# Patient Record
Sex: FEMALE | Race: BLACK OR AFRICAN AMERICAN | URBAN - METROPOLITAN AREA
[De-identification: names, ages, dates, MRNs, and addresses within clinical notes are randomized per-mention and may not be internally consistent; named-entity substitution may affect disease eponyms.]

---

## 2024-05-03 ENCOUNTER — HOSPITAL ENCOUNTER (EMERGENCY)
Facility: HOSPITAL | Age: 34
Discharge: LEFT BEFORE DISCHARGE | End: 2024-05-03

## 2024-05-03 VITALS
TEMPERATURE: 98 F | WEIGHT: 264 LBS | SYSTOLIC BLOOD PRESSURE: 113 MMHG | HEIGHT: 65 IN | HEART RATE: 75 BPM | BODY MASS INDEX: 43.99 KG/M2 | RESPIRATION RATE: 20 BRPM | DIASTOLIC BLOOD PRESSURE: 79 MMHG | OXYGEN SATURATION: 99 %

## 2024-05-03 PROCEDURE — 93005 ELECTROCARDIOGRAM TRACING: CPT

## 2024-05-03 RX ORDER — VALSARTAN AND HYDROCHLOROTHIAZIDE 160; 25 MG/1; MG/1
1 TABLET ORAL DAILY
COMMUNITY

## 2024-05-03 NOTE — ED INITIAL ASSESSMENT (HPI)
PT to ED.   PT reporting high blood pressure and dizzy x 3 days. Took blood pressure while at [employer]. Hx of htn, prescribed valsartan-hydrochlorothiazide 160-25 mg, stopped taking medication in January, started taking medication again 3 days due to high blood pressure from \"something I ate\".

## 2024-05-04 LAB
ATRIAL RATE: 70 BPM
P AXIS: 19 DEGREES
P-R INTERVAL: 162 MS
Q-T INTERVAL: 424 MS
QRS DURATION: 76 MS
QTC CALCULATION (BEZET): 457 MS
R AXIS: 19 DEGREES
T AXIS: -1 DEGREES
VENTRICULAR RATE: 70 BPM

## 2024-07-12 ENCOUNTER — HOSPITAL ENCOUNTER (OUTPATIENT)
Age: 34
Discharge: HOME | End: 2024-07-12
Payer: COMMERCIAL

## 2024-07-12 VITALS
OXYGEN SATURATION: 99 % | SYSTOLIC BLOOD PRESSURE: 111 MMHG | HEART RATE: 79 BPM | TEMPERATURE: 97 F | DIASTOLIC BLOOD PRESSURE: 86 MMHG | RESPIRATION RATE: 20 BRPM

## 2024-07-12 DIAGNOSIS — N76.0 ACUTE VAGINITIS: Primary | ICD-10-CM

## 2024-07-12 LAB — B-HCG UR QL: NEGATIVE

## 2024-07-12 PROCEDURE — 87491 CHLMYD TRACH DNA AMP PROBE: CPT | Performed by: NURSE PRACTITIONER

## 2024-07-12 PROCEDURE — 87591 N.GONORRHOEAE DNA AMP PROB: CPT | Performed by: NURSE PRACTITIONER

## 2024-07-12 RX ORDER — METRONIDAZOLE 500 MG/1
500 TABLET ORAL EVERY 12 HOURS
Qty: 14 TABLET | Refills: 0 | Status: SHIPPED | OUTPATIENT
Start: 2024-07-12 | End: 2024-07-19

## 2024-07-12 NOTE — ED PROVIDER NOTES
Patient Seen in: [facility]      History     Chief Complaint   Patient presents with    Eval-G     Stated Complaint: eval-g    Subjective:   35 y/o female with medical conditions as noted below presents with c/o vaginal itching, irritation and vaginal discharge for 1 week. States has history of recurrent BV. Usually uses boric acid , which helps. No improvement with boric acid. Took a diflucan yesterday. No urinary symptoms, vaginal bleeding, pelvic pain, fever/chills.  Is currently sexually active            Objective:   Past Medical History:    Anxiety    Esophageal reflux    Essential hypertension              History reviewed. No pertinent surgical history.             Social History     Socioeconomic History    Marital status:    Tobacco Use    Smoking status: Never    Smokeless tobacco: Never   Vaping Use    Vaping status: Never Used   Substance and Sexual Activity    Alcohol use: Yes     Comment: socially    Drug use: Yes     Types: Cannabis     Comment: socially     Social Determinants of Health      Received from [facility]              Review of Systems   Constitutional:  Negative for chills and fever.   Gastrointestinal:  Negative for abdominal pain, diarrhea, nausea and vomiting.   Genitourinary:  Positive for vaginal discharge. Negative for dysuria, frequency, urgency and vaginal bleeding.   Neurological:  Negative for headaches.   All other systems reviewed and are negative.      Positive for stated Chief Complaint: Eval-G    Other systems are as noted in HPI.  Constitutional and vital signs reviewed.      All other systems reviewed and negative except as noted above.    Physical Exam     ED Triage Vitals [07/12/24 1227]   /86   Pulse 79   Resp 20   Temp 97.3 °F (36.3 °C)   Temp src Temporal   SpO2 99 %   O2 Device None (Room air)       Current Vitals:   Vital Signs  BP: 111/86  Pulse: 79  Resp: 20  Temp: 97.3 °F (36.3 °C)  Temp src: Temporal    Oxygen  Therapy  SpO2: 99 %  O2 Device: None (Room air)            Physical Exam  Vitals and nursing note reviewed. Exam conducted with a chaperone present (JYOTI Serrano).   Constitutional:       Appearance: Normal appearance.   HENT:      Head: Normocephalic.   Cardiovascular:      Rate and Rhythm: Normal rate and regular rhythm.   Pulmonary:      Effort: Pulmonary effort is normal.      Breath sounds: Normal breath sounds.   Abdominal:      General: Bowel sounds are normal.      Tenderness: There is no abdominal tenderness. There is no right CVA tenderness or left CVA tenderness.   Genitourinary:     Exam position: Lithotomy position.      Vagina: Vaginal discharge present.      Comments: BUS normal - cervical os closed with small amount white discharge.  no adnexal tenderness, no CMT, no genital herpes lesions noted.      Skin:     General: Skin is warm and dry.      Capillary Refill: Capillary refill takes less than 2 seconds.   Neurological:      Mental Status: She is alert and oriented to person, place, and time.               ED Course     Labs Reviewed   POCT PREGNANCY URINE - Normal   CHLAMYDIA/GONOCOCCUS, DECLAN   VAGINITIS VAGINOSIS PCR PANEL                      MDM                  Medical Decision Making  Patient is well-appearing.  I discussed differentials with patient including but not limited to yeast vaginitis versus bacterial vaginosis versus STI  Chaperoned pelvic exam done.  Vaginal cultures pending  Avoid sexual activity x 1 week  Rx metronidazole   Fu with PCP. Return/ ED precautions discussed      Problems Addressed:  Acute vaginitis: acute illness or injury    Amount and/or Complexity of Data Reviewed  Labs: ordered. Decision-making details documented in ED Course.        Disposition and Plan     Clinical Impression:  1. Acute vaginitis         Disposition:  Discharge  7/12/2024  1:05 pm    Follow-up:  Doreen Sen MD  [address] [place] [address]  [address]  [place]  [address]  [phone]                Medications Prescribed:  Current Discharge Medication List        START taking these medications    Details   metRONIDAZOLE 500 MG Oral Tab Take 1 tablet (500 mg total) by mouth every 12 (twelve) hours for 7 days.  Qty: 14 tablet, Refills: 0

## 2024-07-12 NOTE — ED INITIAL ASSESSMENT (HPI)
Pt here with white vaginal discharge, itching and irritation for the last week. Denies odor or pain. Pt recently changed partners and is concerned for BV. Pt has hx of BV with new partners

## 2024-07-13 LAB
BV BACTERIA DNA VAG QL NAA+PROBE: NEGATIVE
C GLABRATA DNA VAG QL NAA+PROBE: NEGATIVE
C KRUSEI DNA VAG QL NAA+PROBE: NEGATIVE
CANDIDA DNA VAG QL NAA+PROBE: NEGATIVE
T VAGINALIS DNA VAG QL NAA+PROBE: NEGATIVE

## 2024-07-15 LAB
C TRACH DNA SPEC QL NAA+PROBE: NEGATIVE
N GONORRHOEA DNA SPEC QL NAA+PROBE: NEGATIVE

## 2024-08-25 ENCOUNTER — HOSPITAL ENCOUNTER (OUTPATIENT)
Age: 34
Discharge: HOME | End: 2024-08-25
Payer: COMMERCIAL

## 2024-08-25 VITALS
DIASTOLIC BLOOD PRESSURE: 82 MMHG | HEART RATE: 95 BPM | SYSTOLIC BLOOD PRESSURE: 129 MMHG | TEMPERATURE: 98 F | OXYGEN SATURATION: 98 % | RESPIRATION RATE: 20 BRPM

## 2024-08-25 DIAGNOSIS — J06.9 UPPER RESPIRATORY TRACT INFECTION, UNSPECIFIED TYPE: Primary | ICD-10-CM

## 2024-08-25 DIAGNOSIS — J98.01 BRONCHOSPASM: ICD-10-CM

## 2024-08-25 DIAGNOSIS — U07.1 COVID-19: ICD-10-CM

## 2024-08-25 DIAGNOSIS — Z20.822 ENCOUNTER FOR LABORATORY TESTING FOR COVID-19 VIRUS: ICD-10-CM

## 2024-08-25 PROBLEM — R06.02 SHORTNESS OF BREATH: Status: ACTIVE | Noted: 2020-04-13

## 2024-08-25 PROBLEM — K59.00 CONSTIPATION: Status: ACTIVE | Noted: 2021-05-26

## 2024-08-25 PROBLEM — K21.9 GASTROESOPHAGEAL REFLUX DISEASE WITHOUT ESOPHAGITIS: Status: ACTIVE | Noted: 2020-04-13

## 2024-08-25 PROBLEM — R13.10 DYSPHAGIA: Status: ACTIVE | Noted: 2021-05-26

## 2024-08-25 PROBLEM — A63.0 GENITAL WARTS DUE TO HPV (HUMAN PAPILLOMAVIRUS): Status: ACTIVE | Noted: 2023-02-24

## 2024-08-25 LAB — SARS-COV-2 RNA RESP QL NAA+PROBE: DETECTED

## 2024-08-25 RX ORDER — ALBUTEROL SULFATE 90 UG/1
AEROSOL, METERED RESPIRATORY (INHALATION)
Qty: 1 EACH | Refills: 0 | Status: SHIPPED | OUTPATIENT
Start: 2024-08-25

## 2024-08-25 RX ORDER — BENZONATATE 100 MG/1
100 CAPSULE ORAL 3 TIMES DAILY PRN
Qty: 30 CAPSULE | Refills: 0 | Status: SHIPPED | OUTPATIENT
Start: 2024-08-25

## 2024-08-25 NOTE — DISCHARGE INSTRUCTIONS
Mucinex every 4-6 hours as needed.   Use spacer with inhaler as discussed  Try to avoid dairy.   Tessalon Perles same time or in between Mucinex.

## 2024-08-25 NOTE — ED INITIAL ASSESSMENT (HPI)
Pt came in due to cough, congestion, runny nose, headache, fatigue, chills, body aches, and decreased appetite for the past few days. Pt has easy non labored respirations.

## 2024-08-25 NOTE — ED PROVIDER NOTES
Patient Seen in: [facility]      History     Chief Complaint   Patient presents with    Cough/URI     Stated Complaint: Cough;Runny nose;Chills;Severe headache;Shortness of breath    Subjective:   HPI    Emperatriz Lanza is a 34 year old female here for cough, runny nose, headache, chills, body aches over the last few days. Feels like she cannot take a deep breath especially after coughing.  Medical history includes but not limited to anxiety, acid reflux, and hypertension.  Inconsistent conservative treatment with minimal relief.  No hemoptysis, recent travel, or other complaints at this time.  Beginning clear full sentences, and hemodynamically appropriate for presenting complaint.      Objective:   Past Medical History:    Anxiety    Esophageal reflux    Essential hypertension              History reviewed. No pertinent surgical history.             No pertinent social history.            Review of Systems    Positive for stated Chief Complaint: Cough/URI    Other systems are as noted in HPI.  Constitutional and vital signs reviewed.      All other systems reviewed and negative except as noted above.    Physical Exam     ED Triage Vitals [08/25/24 1453]   /82   Pulse 95   Resp 20   Temp 98.1 °F (36.7 °C)   Temp src Temporal   SpO2 98 %   O2 Device None (Room air)       Current Vitals:   Vital Signs  BP: 129/82  Pulse: 95  Resp: 20  Temp: 98.1 °F (36.7 °C)  Temp src: Temporal    Oxygen Therapy  SpO2: 98 %  O2 Device: None (Room air)            Physical Exam  Vitals and nursing note reviewed.   Constitutional:       General: She is not in acute distress.     Appearance: Normal appearance. She is not toxic-appearing.   HENT:      Head: Normocephalic.      Right Ear: Tympanic membrane, ear canal and external ear normal.      Left Ear: Tympanic membrane, ear canal and external ear normal.      Nose: Congestion present.      Mouth/Throat:      Mouth: Mucous membranes are moist.      Pharynx: No  oropharyngeal exudate or posterior oropharyngeal erythema.   Eyes:      Extraocular Movements: Extraocular movements intact.      Conjunctiva/sclera: Conjunctivae normal.      Pupils: Pupils are equal, round, and reactive to light.   Cardiovascular:      Rate and Rhythm: Normal rate.      Pulses: Normal pulses.   Pulmonary:      Effort: Pulmonary effort is normal.   Musculoskeletal:         General: No swelling.      Cervical back: Normal range of motion. No erythema or rigidity. No pain with movement, spinous process tenderness or muscular tenderness. Normal range of motion.      Right lower leg: No edema.      Left lower leg: No edema.   Lymphadenopathy:      Cervical: No cervical adenopathy.      Right cervical: No superficial, deep or posterior cervical adenopathy.     Left cervical: No superficial, deep or posterior cervical adenopathy.   Skin:     General: Skin is warm.      Capillary Refill: Capillary refill takes less than 2 seconds.      Findings: No bruising, erythema, lesion or rash.   Neurological:      General: No focal deficit present.      Mental Status: She is alert and oriented to person, place, and time.   Psychiatric:         Mood and Affect: Mood normal.         Behavior: Behavior normal.         Thought Content: Thought content normal.         Judgment: Judgment normal.           ED Course     Labs Reviewed   RAPID SARS-COV-2 BY PCR - Abnormal; Notable for the following components:       Result Value    Rapid SARS-CoV-2 by PCR Detected (*)     All other components within normal limits                      MDM                                         Medical Decision Making  Ddx: URI vs LRI, allergies, reactive, COVID, FLU, RSV, or somatic causes of symptoms    Treat for viral upper respiratory infection r/t covid.  Tessalon Perles, and albuterol sent to the pharmacy to use as directed.  Spacer, teaching done here.  Perc (-). Lungs cta. Xray not indicated a this time.   Supportive care include but  not limited to otc cold medications if there is no contraindication, cool mist humidifier, and oral hydration.  Avoid dairy if possible; This increases mucus production.  Antibiotics do not treat symptoms, or viral illnesses; They are not indicated at this time.    No stridor, No hot muffled speech, and no signs of compromise. Tolerating PO. Neuro wnl.   Reinforced ER precautions, and f/u care as needed. All questions answered, and reassurance given. No acute distress and cleared for home.      Problems Addressed:  COVID-19: acute illness or injury  Encounter for laboratory testing for COVID-19 virus: acute illness or injury  Upper respiratory tract infection, unspecified type: acute illness or injury    Amount and/or Complexity of Data Reviewed  Labs: ordered. Decision-making details documented in ED Course.     Details: Independant interpretation, and reviewed with patient       Risk  OTC drugs.  Prescription drug management.        Disposition and Plan     Clinical Impression:  1. Upper respiratory tract infection, unspecified type    2. Encounter for laboratory testing for COVID-19 virus    3. COVID-19    4. Bronchospasm         Disposition:  Discharge  8/25/2024  3:15 pm    Follow-up:  Doreen Sen MD  [address] [place] [address]  [phone]                Medications Prescribed:  Discharge Medication List as of 8/25/2024  3:17 PM        START taking these medications    Details   nirmatrelvir-ritonavir 300-100 MG Oral Tablet Therapy Pack Take two nirmatrelvir tablets (300mg) with one ritonavir tablet (100mg) together twice daily for 5 days., Print, Disp-30 tablet, R-0This may be filled at any pharmacy.  NPI [id number].  Collaborating physician Briana Bui.      albuterol 108 (90 Base) MCG/ACT Inhalation Aero Soln Inhale 1 puff and hold breath for 10 seconds then exhale.  Wait 1 full minute and repeat for second puff.  Use every 4-6 hours as needed., Normal, Disp-1 each, R-0NPI  [id number]. Collaborating MD Briana Bui.      benzonatate 100 MG Oral Cap Take 1 capsule (100 mg total) by mouth 3 (three) times daily as needed for cough., Normal, Disp-30 capsule, R-0NPI [id number].  Collaborating physician Briana Bui.

## 2024-08-27 ENCOUNTER — APPOINTMENT (OUTPATIENT)
Dept: GENERAL RADIOLOGY | Age: 34
End: 2024-08-27
Payer: COMMERCIAL

## 2024-08-27 ENCOUNTER — HOSPITAL ENCOUNTER (OUTPATIENT)
Age: 34
Discharge: HOME | End: 2024-08-27
Payer: COMMERCIAL

## 2024-08-27 ENCOUNTER — APPOINTMENT (OUTPATIENT)
Dept: CT IMAGING | Facility: HOSPITAL | Age: 34
End: 2024-08-27
Payer: COMMERCIAL

## 2024-08-27 VITALS
OXYGEN SATURATION: 100 % | HEART RATE: 90 BPM | SYSTOLIC BLOOD PRESSURE: 116 MMHG | DIASTOLIC BLOOD PRESSURE: 83 MMHG | TEMPERATURE: 98 F | RESPIRATION RATE: 18 BRPM

## 2024-08-27 DIAGNOSIS — F41.9 ANXIETY: ICD-10-CM

## 2024-08-27 DIAGNOSIS — M94.0 COSTOCHONDRITIS: ICD-10-CM

## 2024-08-27 DIAGNOSIS — U07.1 COVID-19: Primary | ICD-10-CM

## 2024-08-27 DIAGNOSIS — R06.02 SHORTNESS OF BREATH: ICD-10-CM

## 2024-08-27 DIAGNOSIS — R79.89 POSITIVE D-DIMER: ICD-10-CM

## 2024-08-27 LAB
#MXD IC: 0.4 X10ˆ3/UL (ref 0.1–1)
#MXD IC: 0.4 X10ˆ3/UL (ref 0.1–1)
ATRIAL RATE: 89 BPM
BUN BLD-MCNC: 13 MG/DL (ref 7–18)
CHLORIDE BLD-SCNC: 103 MMOL/L (ref 98–112)
CO2 BLD-SCNC: 25 MMOL/L (ref 21–32)
CREAT BLD-MCNC: 1.2 MG/DL
DDIMER WHOLE BLOOD: 455 NG/ML DDU (ref ?–400)
EGFRCR SERPLBLD CKD-EPI 2021: 61 ML/MIN/1.73M2 (ref 60–?)
GLUCOSE BLD-MCNC: 101 MG/DL (ref 70–99)
HCT VFR BLD AUTO: 41.4 %
HCT VFR BLD AUTO: 41.6 %
HCT VFR BLD CALC: 46 %
HGB BLD-MCNC: 13.6 G/DL
HGB BLD-MCNC: 14 G/DL
ISTAT IONIZED CALCIUM FOR CHEM 8: 1.09 MMOL/L (ref 1.12–1.32)
LYMPHOCYTES # BLD AUTO: 2.5 X10ˆ3/UL (ref 1–4)
LYMPHOCYTES # BLD AUTO: 2.6 X10ˆ3/UL (ref 1–4)
LYMPHOCYTES NFR BLD AUTO: 33.2 %
LYMPHOCYTES NFR BLD AUTO: 35 %
MCH RBC QN AUTO: 31 PG (ref 26–34)
MCH RBC QN AUTO: 32.1 PG (ref 26–34)
MCHC RBC AUTO-ENTMCNC: 32.7 G/DL (ref 31–37)
MCHC RBC AUTO-ENTMCNC: 33.8 G/DL (ref 31–37)
MCV RBC AUTO: 94.8 FL (ref 80–100)
MCV RBC AUTO: 95 FL (ref 80–100)
MIXED CELL %: 5.1 %
MIXED CELL %: 5.6 %
NEUTROPHILS # BLD AUTO: 4.5 X10ˆ3/UL (ref 1.5–7.7)
NEUTROPHILS # BLD AUTO: 4.6 X10ˆ3/UL (ref 1.5–7.7)
NEUTROPHILS NFR BLD AUTO: 59.9 %
NEUTROPHILS NFR BLD AUTO: 61.2 %
P AXIS: 66 DEGREES
P-R INTERVAL: 156 MS
PLATELET # BLD AUTO: 467 X10ˆ3/UL (ref 150–450)
POTASSIUM BLD-SCNC: 4.5 MMOL/L (ref 3.6–5.1)
Q-T INTERVAL: 344 MS
QRS DURATION: 72 MS
QTC CALCULATION (BEZET): 418 MS
R AXIS: 34 DEGREES
RBC # BLD AUTO: 4.36 X10ˆ6/UL
RBC # BLD AUTO: 4.39 X10ˆ6/UL
SODIUM BLD-SCNC: 137 MMOL/L (ref 136–145)
T AXIS: 0 DEGREES
TROPONIN I BLD-MCNC: <0.02 NG/ML
VENTRICULAR RATE: 89 BPM
WBC # BLD AUTO: 7.5 X10ˆ3/UL (ref 4–11)
WBC # BLD AUTO: 7.5 X10ˆ3/UL (ref 4–11)

## 2024-08-27 PROCEDURE — 71260 CT THORAX DX C+: CPT

## 2024-08-27 PROCEDURE — 71046 X-RAY EXAM CHEST 2 VIEWS: CPT

## 2024-08-27 PROCEDURE — 93000 ELECTROCARDIOGRAM COMPLETE: CPT

## 2024-08-27 PROCEDURE — 80047 BASIC METABLC PNL IONIZED CA: CPT

## 2024-08-27 PROCEDURE — 85025 COMPLETE CBC W/AUTO DIFF WBC: CPT

## 2024-08-27 PROCEDURE — 96374 THER/PROPH/DIAG INJ IV PUSH: CPT

## 2024-08-27 PROCEDURE — 84484 ASSAY OF TROPONIN QUANT: CPT

## 2024-08-27 PROCEDURE — 99214 OFFICE O/P EST MOD 30 MIN: CPT

## 2024-08-27 RX ORDER — ONDANSETRON 2 MG/ML
4 INJECTION INTRAMUSCULAR; INTRAVENOUS ONCE
Status: COMPLETED | OUTPATIENT
Start: 2024-08-27 | End: 2024-08-27

## 2024-08-27 RX ORDER — ALPRAZOLAM 0.25 MG
0.25 TABLET ORAL 2 TIMES DAILY PRN
Qty: 20 TABLET | Refills: 0 | Status: SHIPPED | OUTPATIENT
Start: 2024-08-27 | End: 2024-09-03

## 2024-08-27 NOTE — ED QUICK NOTES
Pt transferred to [facility] for outpatient CT with .  Saline lock to R AC wrapped with kerlix.  Purple top blood redrawn as first draw was a short draw. POCT CBC done on new sample. Provider aware.

## 2024-08-27 NOTE — ED PROVIDER NOTES
Patient Seen in: [facility]      History     Chief Complaint   Patient presents with    Dizziness    Covid     Stated Complaint: Sob; chest pressure; dizzy; weakness  Subjective:   Emperatriz is a 34-year-old female presenting to the immediate care complaining of dizziness, lightheadedness, cough, shortness of breath and chest pain.  Patient states that about an hour and a half ago she had an episode of 20 minutes of chest pain and shortness of breath that resolved on its own.  Patient states that she had heaviness on her chest and felt very short of breath for short time.  States that she has had persistent dizziness and lightheadedness since then.  Patient does state that she has a history of anxiety/panic attacks.  States this episode is worse than her normal panic attacks.  2 days ago patient was in the [place] immediate care for URI complaints and was diagnosed with COVID.  Patient was given Paxlovid that day, she has not taken it.  Patient states that her symptoms have been for a total of 6 days now.  Patient states that she still has some mild shortness of breath and chest heaviness.  She has not had a fever.  She has not had any syncope.  She denies any abdominal pain or vomiting.  She is eating and drinking well and is well-hydrated.  She denies any other concerns or complaints.        Objective:   Past Medical History:    Anxiety    Esophageal reflux    Essential hypertension            History reviewed. No pertinent surgical history.           Social History     Socioeconomic History    Marital status:    Tobacco Use    Smoking status: Never    Smokeless tobacco: Never   Vaping Use    Vaping status: Never Used   Substance and Sexual Activity    Alcohol use: Yes     Comment: socially    Drug use: Yes     Types: Cannabis     Comment: socially     Social Determinants of Health      Received from [facility]            Review of Systems    Positive for stated complaint:  Dizziness and Covid    Other systems are as noted in HPI.  Constitutional and vital signs reviewed.      All other systems reviewed and negative except as noted above.    Physical Exam     ED Triage Vitals   BP 08/27/24 1546 114/76   Pulse 08/27/24 1546 101   Resp 08/27/24 1546 18   Temp 08/27/24 1546 97.6 °F (36.4 °C)   Temp src 08/27/24 1546 Temporal   SpO2 08/27/24 1546 99 %   O2 Device 08/27/24 1545 None (Room air)     Current:/83   Pulse 90   Temp 97.6 °F (36.4 °C) (Temporal)   Resp 18   LMP 08/25/2024 (Approximate)   SpO2 100%     Physical Exam  Vitals and nursing note reviewed.   Constitutional:       General: She is not in acute distress.     Appearance: Normal appearance. She is not ill-appearing, toxic-appearing or diaphoretic.   HENT:      Head: Normocephalic.      Right Ear: Tympanic membrane, ear canal and external ear normal.      Left Ear: Tympanic membrane, ear canal and external ear normal.      Nose: Nose normal.      Mouth/Throat:      Mouth: Mucous membranes are moist.      Pharynx: Oropharynx is clear.   Eyes:      Conjunctiva/sclera: Conjunctivae normal.   Cardiovascular:      Rate and Rhythm: Normal rate and regular rhythm.      Pulses: Normal pulses.      Heart sounds: Normal heart sounds.   Pulmonary:      Effort: Pulmonary effort is normal. No tachypnea, bradypnea, accessory muscle usage, prolonged expiration, respiratory distress or retractions.      Breath sounds: Normal breath sounds and air entry. No stridor, decreased air movement or transmitted upper airway sounds. No decreased breath sounds, wheezing, rhonchi or rales.   Abdominal:      General: Abdomen is flat.   Musculoskeletal:         General: Normal range of motion.      Cervical back: Normal range of motion.      Right lower leg: No edema.      Left lower leg: No edema.   Skin:     General: Skin is warm.      Capillary Refill: Capillary refill takes less than 2 seconds.   Neurological:      General: No focal deficit  present.      Mental Status: She is alert and oriented to person, place, and time.   Psychiatric:         Mood and Affect: Mood normal.         Behavior: Behavior normal.         Thought Content: Thought content normal.         Judgment: Judgment normal.         ED Course   Radiology:  CT CHEST PE AORTA (IV ONLY) (CPT=71260)    Result Date: 8/27/2024  CONCLUSION:  No acute pulmonary embolus to the subsegmental pulmonary artery level.  No acute aortic injury; no dissection.  Dictated by (CST): Simon Segura MD on 8/27/2024 at 6:04 PM     Finalized by (CST): Simon Segura MD on 8/27/2024 at 6:08 PM          XR CHEST PA + LAT CHEST (CPT=71046)    Result Date: 8/27/2024  CONCLUSION: No acute cardiopulmonary abnormality.   Dictated by (CST): Simon Segura MD on 8/27/2024 at 4:42 PM     Finalized by (CST): Simon Segura MD on 8/27/2024 at 4:43 PM         Labs Reviewed   D-DIMER (POC) - Abnormal; Notable for the following components:       Result Value    D-Dimer  (*)     All other components within normal limits   POCT CBC - Abnormal; Notable for the following components:    PLT .0 (*)     All other components within normal limits   POCT ISTAT CHEM8 CARTRIDGE - Abnormal; Notable for the following components:    ISTAT Ionized Calcium 1.09 (*)     ISTAT Glucose 101 (*)     ISTAT Creatinine 1.20 (*)     All other components within normal limits   ISTAT TROPONIN - Normal   POCT CBC     EKG    Rate, intervals and axes as noted on EKG Report.  Rate: 89  Rhythm: Sinus Rhythm  Reading: Normal sinus rhythm.  Similar when compared to previous EKG           MDM     Medical Decision Making  Differential diagnoses reflecting the complexity of care include but are not limited to COVID symptoms, costochondritis, anxiety, less likely PE or ACS.    Comorbidities that add complexity to management include: COVID, anxiety  History obtained by an independent source was from: Patient  My independent interpretations of studies include:  X-ray  Patient is well appearing, non-toxic and in no acute distress.  Vital signs are stable.   Patient's history and physical exam are consistent with COVID symptoms and anxiety.  Described an episode of anxiety related chest pain and shortness of breath.  A cardiac workup was completed due to patient's current COVID-positive test that she took yesterday.  EKG was unchanged from previous.  Chest x-ray was normal.  CBC and BMP were unremarkable.  Troponin was negative.  D-dimer was elevated.  Patient was sent for a CT scan of the chest which did not show any PE.  COVID test was positive at home yesterday.  Recommended that patient quarantine until fever free for 24 hours without medication.  Patient is vaccinated against COVID.    I did send a prescription for low-dose of Xanax to be taken as needed with drowsiness precautions.  Recommended that she make an appointment to follow-up with her PCP for continued anxiety.  Recommended that regardless of symptoms, the patient should wear a mask in public for 5 days.   Also advised to go to the emergency department if the patient develops any chest pain, shortness of breath, fever, vomiting, diarrhea or any other concerning complaints.  Discussed symptomatic management.  Recommended Flonase for nasal congestion, mucinex for chest congestion, tylenol or ibuprofen for fever or pain.  Recommended increasing PO fluid intake.     ED precautions discussed.  Patient (guardian) advised to follow up with PCP in 2-3 days.  Patient (guardian) agrees with this plan of care.  Patient (guardian) verbalizes understanding of discharge instructions and plan of care.  Patient discussed with Dr. Stevens on the phone who agrees with this plan.      Amount and/or Complexity of Data Reviewed  Labs: ordered. Decision-making details documented in ED Course.  Radiology: ordered and independent interpretation performed. Decision-making details documented in ED Course.  ECG/medicine tests: ordered  and independent interpretation performed. Decision-making details documented in ED Course.    Risk  OTC drugs.  Prescription drug management.        Disposition and Plan     Clinical Impression:  1. COVID-19    2. Shortness of breath    3. Positive D-dimer    4. Costochondritis    5. Anxiety         Disposition:  Discharge  8/27/2024  6:21 pm    Follow-up:  Doreen Sen MD  [facility] [address] [place] [address]  [phone]                Medications Prescribed:  Discharge Medication List as of 8/27/2024  6:23 PM        START taking these medications    Details   ALPRAZolam 0.25 MG Oral Tab Take 1 tablet (0.25 mg total) by mouth 2 (two) times daily as needed for Anxiety., Normal, Disp-20 tablet, R-0

## 2024-08-27 NOTE — ED INITIAL ASSESSMENT (HPI)
Pt c/o dizziness, SOB, lightheaded, weakness, cough, headache x6 days.  No fever.  Seen at [facility] 2 days ago, positive covid.

## 2024-08-27 NOTE — DISCHARGE INSTRUCTIONS
Symptoms are all consistent with having COVID.  I believe that the chest pain and shortness of breath that you had earlier were related to exacerbation of your COVID symptoms due to some anxiety.  Your chest x-ray was normal.  Your lab tests were all normal except for the elevated D-dimer that we discussed which is why you got the CT scan of your chest.  A CT scan of your chest was normal also.  I sent you a prescription for Xanax to be used as needed for anxiety.  Please remember this will make you drowsy so do not drive or drink alcohol when you take it.    You should quarantine for fever.  You should be fever free for 24 hours without medication prior to returning to public activities.     Regardless of your symptoms, you should wear a mask in public for 5 days.     Go to the emergency department if you develop any chest pain, shortness of breath, fever, vomiting, diarrhea or any other concerning complaints.    Use Flonase for nasal congestion, mucinex for chest congestion, tylenol or ibuprofen for fever or pain.  Increase PO fluid intake.    Follow up with PCP in 2-3 days.

## 2024-10-21 ENCOUNTER — HOSPITAL ENCOUNTER (OUTPATIENT)
Age: 34
Discharge: HOME | End: 2024-10-21
Payer: COMMERCIAL

## 2024-10-21 VITALS
TEMPERATURE: 97 F | HEART RATE: 80 BPM | OXYGEN SATURATION: 100 % | RESPIRATION RATE: 16 BRPM | SYSTOLIC BLOOD PRESSURE: 119 MMHG | DIASTOLIC BLOOD PRESSURE: 76 MMHG

## 2024-10-21 DIAGNOSIS — L73.9 FOLLICULITIS: Primary | ICD-10-CM

## 2024-10-21 LAB — B-HCG UR QL: POSITIVE

## 2024-10-21 PROCEDURE — 81025 URINE PREGNANCY TEST: CPT

## 2024-10-21 PROCEDURE — 81514 NFCT DS BV&VAGINITIS DNA ALG: CPT | Performed by: NURSE PRACTITIONER

## 2024-10-21 PROCEDURE — 99214 OFFICE O/P EST MOD 30 MIN: CPT

## 2024-10-21 PROCEDURE — 87491 CHLMYD TRACH DNA AMP PROBE: CPT | Performed by: NURSE PRACTITIONER

## 2024-10-21 PROCEDURE — 87591 N.GONORRHOEAE DNA AMP PROB: CPT | Performed by: NURSE PRACTITIONER

## 2024-10-21 RX ORDER — MUPIROCIN 20 MG/G
1 OINTMENT TOPICAL 3 TIMES DAILY
Qty: 22 G | Refills: 0 | Status: SHIPPED | OUTPATIENT
Start: 2024-10-21 | End: 2024-10-28

## 2024-10-21 NOTE — ED PROVIDER NOTES
Patient Seen in: [facility] [place]      History     Chief Complaint   Patient presents with    Cyst     Stated Complaint: Cyst  Subjective:   34-year-old female with hypertension, anxiety presents from home.  Patient states she has had a bump on her right labia for over 2 weeks.  It is slightly tender to touch.  She denies any drainage.  She has tried to pop it.  No fever.  No previous history of abscess, although states her mother gets them.  Patient states that she is 2 months pregnant.  G2, P1.  She does have her first appointment with her primary doctor tomorrow to discuss the pregnancy.  She denies abdominal pain or vaginal bleeding.  She does voice concern for possible STD or BV and would like testing today.    The history is provided by the patient. No  was used.     Objective:   Past Medical History:    Anxiety    Esophageal reflux    Essential hypertension            History reviewed. No pertinent surgical history.           Social History     Socioeconomic History    Marital status:    Tobacco Use    Smoking status: Never    Smokeless tobacco: Never   Vaping Use    Vaping status: Never Used   Substance and Sexual Activity    Alcohol use: Yes     Comment: socially    Drug use: Yes     Types: Cannabis     Comment: socially     Social Drivers of Health      [facility]            Review of Systems    Positive for stated complaint: Cyst    Other systems are as noted in HPI.  Constitutional and vital signs reviewed.      All other systems reviewed and negative except as noted above.    Physical Exam     ED Triage Vitals [10/21/24 1403]   /76   Pulse 80   Resp 16   Temp 97.3 °F (36.3 °C)   Temp src Temporal   SpO2 100 %   O2 Device None (Room air)     Current:/76   Pulse 80   Temp 97.3 °F (36.3 °C) (Temporal)   Resp 16   LMP 08/10/2024 (Approximate)   SpO2 100%     Physical Exam  Vitals and nursing note reviewed. Exam conducted with a  chaperone present (Renay MONTES).   Constitutional:       General: She is not in acute distress.     Appearance: Normal appearance. She is not ill-appearing or toxic-appearing.   HENT:      Head: Normocephalic and atraumatic.      Nose: Nose normal.      Mouth/Throat:      Mouth: Mucous membranes are moist.      Pharynx: Oropharynx is clear.   Eyes:      Pupils: Pupils are equal, round, and reactive to light.   Cardiovascular:      Rate and Rhythm: Normal rate.   Pulmonary:      Effort: Pulmonary effort is normal. No respiratory distress.   Abdominal:      General: Abdomen is flat.      Palpations: Abdomen is soft.   Genitourinary:     Labia:         Right: Lesion present.       Comments: 0.25cm small superficial round area of fluctuance with spontaneous drainage. Slightly tender.   Musculoskeletal:         General: No signs of injury. Normal range of motion.      Cervical back: Normal range of motion and neck supple.   Skin:     General: Skin is warm and dry.      Capillary Refill: Capillary refill takes less than 2 seconds.   Neurological:      General: No focal deficit present.      Mental Status: She is alert and oriented to person, place, and time.      GCS: GCS eye subscore is 4. GCS verbal subscore is 5. GCS motor subscore is 6.   Psychiatric:         Mood and Affect: Mood normal.         Behavior: Behavior normal.         Thought Content: Thought content normal.         Judgment: Judgment normal.         ED Course   Radiology:  No results found.  Labs Reviewed   POCT PREGNANCY URINE - Abnormal; Notable for the following components:       Result Value    POCT Urine Pregnancy Positive (*)     All other components within normal limits   CHLAMYDIA/GONOCOCCUS, DECLAN   VAGINITIS VAGINOSIS PCR PANEL       MDM     Medical Decision Making  Differential diagnoses reflecting the complexity of care include: labia abscess, folliculitis, STD, BV  Pregnancy test is positive.  LMP 8/10.  Patient around 10 weeks pregnant.  Has  first appointment with her primary doctor to discuss this tomorrow  She does have a very small superficial abscess on the right labia that is consistent with folliculitis.  It is spontaneously draining here, gentle pressure was applied.  Further expressed.  No indication for incision and drainage.  No surrounding cellulitis.  Patient recently had sugaring done on her pubic hair. This may have created an ingrown.  Not a bartholin cyst  No urinary symptoms or concern for UTI  States history of BV and would like to be tested today.  Patient declined pelvic exam.  Self swab for BV.  She would also like STD testing.  Urine sample was collected.    Plan of Care: Warm compresses. Topical mupirocin. establish care with OB. Take prenatals.     Results and plan of care discussed with the patient/family. They are in agreement with discharge. They understand to follow up with their primary doctor or the referral physician for further evaluation, especially if no improvement.  Also discussed the limitations of immediate care, patient is aware that if symptoms are worse they should go to the emergency room. Verbal and written discharge instructions were given.     My independent interpretation of studies of: preg positive  Diagnostic tests and medications considered but not ordered were: No indication for incision and drainage today  Shared decision making was done by: Patient requesting BV and STD testing  Comorbidities that add complexity to management include: Hypertension and anxiety  External chart review was done and was noted: Reviewed, noncontributory, 5 acute visits in the last 5 months  History obtained by an independent source was from: N/A  Discussions and management was done with: N/A  Social determinants of health that affect care: Ethnicity              Problems Addressed:  Folliculitis: acute illness or injury    Amount and/or Complexity of Data Reviewed  Labs: ordered. Decision-making details documented in ED  Course.    Risk  Prescription drug management.        Disposition and Plan     Clinical Impression:  1. Folliculitis         Disposition:  Discharge  10/21/2024  2:22 pm    Follow-up:  Doreen Sen MD  [address]  [place] [address]  [phone]          Edna Larkin, APRN  [address]  [facility] [address]  [phone]                Medications Prescribed:  Discharge Medication List as of 10/21/2024  2:27 PM        START taking these medications    Details   mupirocin 2 % External Ointment Apply 1 Application topically 3 (three) times daily for 7 days., Normal, Disp-22 g, R-0

## 2024-10-21 NOTE — DISCHARGE INSTRUCTIONS
Apply warm moist compresses or sit in the bath. Apply gentle pressure to the area. Gentle exfoliation. Apply the antibiotic ointment 3 times a day. Take prenatal vitamins daily. Vaginal cultures are pending. Schedule follow up with OB.

## 2024-10-21 NOTE — ED INITIAL ASSESSMENT (HPI)
Cyst to r side of labia for 2-3 weeks, no fever, pt is 2 months pregnant, will see pcp tomorrow, no vaginal bleeding or discharge

## 2024-10-22 LAB
BV BACTERIA DNA VAG QL NAA+PROBE: NEGATIVE
C GLABRATA DNA VAG QL NAA+PROBE: NEGATIVE
C KRUSEI DNA VAG QL NAA+PROBE: NEGATIVE
C TRACH DNA SPEC QL NAA+PROBE: NEGATIVE
CANDIDA DNA VAG QL NAA+PROBE: POSITIVE
N GONORRHOEA DNA SPEC QL NAA+PROBE: NEGATIVE
T VAGINALIS DNA VAG QL NAA+PROBE: NEGATIVE

## 2024-12-12 ENCOUNTER — HOSPITAL ENCOUNTER (OUTPATIENT)
Age: 34
Discharge: HOME | End: 2024-12-12
Payer: COMMERCIAL

## 2024-12-12 VITALS
RESPIRATION RATE: 20 BRPM | SYSTOLIC BLOOD PRESSURE: 141 MMHG | OXYGEN SATURATION: 100 % | HEART RATE: 76 BPM | TEMPERATURE: 99 F | DIASTOLIC BLOOD PRESSURE: 86 MMHG

## 2024-12-12 DIAGNOSIS — N89.8 VAGINAL IRRITATION: Primary | ICD-10-CM

## 2024-12-12 DIAGNOSIS — N89.8 VAGINAL ITCHING: ICD-10-CM

## 2024-12-12 DIAGNOSIS — R10.2 PELVIC PAIN: ICD-10-CM

## 2024-12-12 LAB
B-HCG UR QL: NEGATIVE
BILIRUB UR QL STRIP: NEGATIVE
CLARITY UR: CLEAR
COLOR UR: YELLOW
GLUCOSE UR STRIP-MCNC: NEGATIVE MG/DL
HGB UR QL STRIP: NEGATIVE
KETONES UR STRIP-MCNC: NEGATIVE MG/DL
NITRITE UR QL STRIP: NEGATIVE
PH UR STRIP: 6.5 [PH]
PROT UR STRIP-MCNC: NEGATIVE MG/DL
SP GR UR STRIP: 1.02
UROBILINOGEN UR STRIP-ACNC: <2 MG/DL

## 2024-12-12 PROCEDURE — 81514 NFCT DS BV&VAGINITIS DNA ALG: CPT | Performed by: NURSE PRACTITIONER

## 2024-12-12 PROCEDURE — 87491 CHLMYD TRACH DNA AMP PROBE: CPT | Performed by: NURSE PRACTITIONER

## 2024-12-12 PROCEDURE — 87591 N.GONORRHOEAE DNA AMP PROB: CPT | Performed by: NURSE PRACTITIONER

## 2024-12-12 PROCEDURE — 87086 URINE CULTURE/COLONY COUNT: CPT | Performed by: NURSE PRACTITIONER

## 2024-12-12 RX ORDER — FLUCONAZOLE 150 MG/1
150 TABLET ORAL ONCE
Qty: 1 TABLET | Refills: 0 | Status: SHIPPED | OUTPATIENT
Start: 2024-12-12 | End: 2024-12-12

## 2024-12-12 RX ORDER — METRONIDAZOLE 500 MG/1
500 TABLET ORAL 2 TIMES DAILY
Qty: 14 TABLET | Refills: 0 | Status: SHIPPED | OUTPATIENT
Start: 2024-12-12 | End: 2024-12-19

## 2024-12-12 RX ORDER — NITROFURANTOIN 25; 75 MG/1; MG/1
100 CAPSULE ORAL 2 TIMES DAILY
Qty: 10 CAPSULE | Refills: 0 | Status: SHIPPED | OUTPATIENT
Start: 2024-12-12 | End: 2024-12-17

## 2024-12-13 LAB
BV BACTERIA DNA VAG QL NAA+PROBE: POSITIVE
C GLABRATA DNA VAG QL NAA+PROBE: NEGATIVE
C KRUSEI DNA VAG QL NAA+PROBE: NEGATIVE
C TRACH DNA SPEC QL NAA+PROBE: NEGATIVE
CANDIDA DNA VAG QL NAA+PROBE: NEGATIVE
N GONORRHOEA DNA SPEC QL NAA+PROBE: NEGATIVE
T VAGINALIS DNA VAG QL NAA+PROBE: NEGATIVE

## 2024-12-13 NOTE — ED PROVIDER NOTES
Patient Seen in: [facility]      History     Chief Complaint   Patient presents with    Gynecologic Exam     Stated Complaint: Urine test    Subjective:   HPI    This is a 34-year-old female with history of anxiety esophageal reflux and hypertension presenting with vaginal complaint.  Patient states that she has been having urinary frequency and urgency also having vaginal irritation and itchiness.  Patient states she is recently sexually active with a new partner and about 2 months ago had a chemical .  Patient states she has been dealing with chronic pelvic pain was seen by gynecology over the summer and had an ultrasound done that showed that she had 2 small fibroids.  Patient states nothing really feels like anything is changed with the pelvic pain but she was concerned about possible STI or BV as she has a history of recurrent BV and has been treated multiple times for it.  Patient states she also is having irregular menstrual cycles.  Denies fever nausea or vomiting or back pain.    Objective:     Past Medical History:    Anxiety    Esophageal reflux    Essential hypertension              History reviewed. No pertinent surgical history.             Social History     Socioeconomic History    Marital status:    Tobacco Use    Smoking status: Never    Smokeless tobacco: Never   Vaping Use    Vaping status: Never Used   Substance and Sexual Activity    Alcohol use: Yes     Comment: socially    Drug use: Yes     Types: Cannabis     Comment: socially     Social Drivers of Health      Received from [employer]    [facility]              Review of Systems    Positive for stated complaint: Urine test  Other systems are as noted in HPI.  Constitutional and vital signs reviewed.      All other systems reviewed and negative except as noted above.    Physical Exam     ED Triage Vitals [24 1850]   /86   Pulse 76   Resp 20   Temp 98.8 °F (37.1 °C)   Temp src Oral   SpO2 100 %   O2  Device None (Room air)       Current Vitals:   Vital Signs  BP: 141/86  Pulse: 76  Resp: 20  Temp: 98.8 °F (37.1 °C)  Temp src: Oral    Oxygen Therapy  SpO2: 100 %  O2 Device: None (Room air)        Physical Exam  Vitals and nursing note reviewed. Exam conducted with a chaperone present (Meghan RN at bedside for exam).   Constitutional:       Appearance: Normal appearance.   HENT:      Right Ear: External ear normal.      Left Ear: External ear normal.      Nose: Nose normal.      Mouth/Throat:      Mouth: Mucous membranes are moist.      Pharynx: Oropharynx is clear.   Eyes:      Conjunctiva/sclera: Conjunctivae normal.   Abdominal:      Palpations: Abdomen is soft.      Tenderness: There is no abdominal tenderness. There is no right CVA tenderness or left CVA tenderness.   Genitourinary:     Comments: External exam no lesions or ulcerations internal exam small amount of clear white vaginal discharge no abnormal vaginal discharge no CMT adnexal or suprapubic TTP  Musculoskeletal:         General: Normal range of motion.      Cervical back: Normal range of motion.   Neurological:      Mental Status: She is alert and oriented to person, place, and time.             ED Course     Labs Reviewed   [facility] POCT URINALYSIS DIPSTICK - Abnormal; Notable for the following components:       Result Value    Leukocyte esterase urine Trace (*)     All other components within normal limits   POCT PREGNANCY URINE - Normal   CHLAMYDIA/GONOCOCCUS, DECLAN   VAGINITIS VAGINOSIS PCR PANEL   URINE CULTURE, ROUTINE               MDM         Medical Decision Making  34-year-old female well-appearing nontoxic with chronic pelvic pain history of BV with some vaginal irritation and urinary symptoms.  DDx BV versus yeast infection versus STI versus dysuria versus UTI versus interstitial cystitis versus PID.  No clinical indication for labs or imaging she has no abdominal tenderness pelvic exam will be performed if there is no tenderness on exam  patient will be discharged to follow-up with her gynecologist urine dip UCG urine culture vaginitis panel and GC chlamydia swabs will be collected.  Patient is agreeable with this plan of care.    Pelvic exam as documented above no significant findings and no tenderness.  Discussed with the patient urine notes leukocytes this could be a contaminated urine or could be an infection discussed waiting for treatment versus starting treatment patient would like to start treatment for possible UTI patient would also like to start treatment for possible BV she will receive a one-time dose of Diflucan along with the metronidazole in the event she has a yeast infection.  Discussed outpatient follow-up with her gynecologist for further evaluation all education and instructions including ER precautions placed in discharge paperwork.  Patient feels comfortable with the plan of care and acknowledges understanding discharge instructions.    Problems Addressed:  Pelvic pain: acute illness or injury  Vaginal irritation: acute illness or injury  Vaginal itching: acute illness or injury    Amount and/or Complexity of Data Reviewed  Labs: ordered. Decision-making details documented in ED Course.  Discussion of management or test interpretation with external provider(s): This patient was discussed with my attending Dr. Stevens who agrees with this provider's management and plan of care.    Risk  OTC drugs.  Prescription drug management.        Disposition and Plan     Clinical Impression:  1. Vaginal irritation    2. Vaginal itching    3. Pelvic pain         Disposition:  Discharge  12/12/2024  7:16 pm    Follow-up:  Doreen Sen MD  [address] [place] [address]  [phone]    In 1 week            Medications Prescribed:  Discharge Medication List as of 12/12/2024  7:19 PM        START taking these medications    Details   fluconazole (DIFLUCAN) 150 MG Oral Tab Take 1 tablet (150 mg total) by mouth once for 1 dose.,  Normal, Disp-1 tablet, R-0      metroNIDAZOLE 500 MG Oral Tab Take 1 tablet (500 mg total) by mouth in the morning and 1 tablet (500 mg total) before bedtime. Do all this for 7 days., Normal, Disp-14 tablet, R-0      nitrofurantoin monohydrate macro 100 MG Oral Cap Take 1 capsule (100 mg total) by mouth 2 (two) times daily for 5 days., Normal, Disp-10 capsule, R-0                 Supplementary Documentation:

## 2024-12-13 NOTE — ED INITIAL ASSESSMENT (HPI)
Pt presents to the IC with c/o urinary urgency and frequency, coupled with vaginal irritation. Pt reports a new sexual partner. Hx of BV and chemical  approx 2months ago. Pt has an upcoming appt with her gyne.

## 2024-12-13 NOTE — DISCHARGE INSTRUCTIONS
Start the antibiotics as prescribed recommend taking a probiotic or eating yogurt as some antibiotics can cause upset stomach and diarrhea do not drink alcohol while taking the metronidazole as it will make you sick continue good vaginal hygiene no sexual activity for 2 weeks follow-up with your gynecologist drink plenty of water urinate whenever you have the urge if you develop worsening pelvic pain back pain fever nausea or vomiting or heavy vaginal bleeding or any new or worsening symptoms go to the nearest emergency department.  You will be notified of results if additional treatment is needed and will be prescribed.

## 2025-03-20 ENCOUNTER — OFFICE VISIT (OUTPATIENT)
Dept: OBGYN CLINIC | Facility: CLINIC | Age: 35
End: 2025-03-20
Payer: COMMERCIAL

## 2025-03-20 ENCOUNTER — LAB ENCOUNTER (OUTPATIENT)
Dept: LAB | Facility: HOSPITAL | Age: 35
End: 2025-03-20
Attending: NURSE PRACTITIONER
Payer: COMMERCIAL

## 2025-03-20 VITALS
BODY MASS INDEX: 41 KG/M2 | HEART RATE: 84 BPM | WEIGHT: 247 LBS | SYSTOLIC BLOOD PRESSURE: 104 MMHG | DIASTOLIC BLOOD PRESSURE: 74 MMHG

## 2025-03-20 DIAGNOSIS — Z12.4 SCREENING FOR CERVICAL CANCER: ICD-10-CM

## 2025-03-20 DIAGNOSIS — R39.15 URINARY URGENCY: ICD-10-CM

## 2025-03-20 DIAGNOSIS — Z11.3 SCREENING EXAMINATION FOR STI: Primary | ICD-10-CM

## 2025-03-20 DIAGNOSIS — Z01.419 WELL WOMAN EXAM WITH ROUTINE GYNECOLOGICAL EXAM: Primary | ICD-10-CM

## 2025-03-20 DIAGNOSIS — Z11.3 SCREENING EXAMINATION FOR STI: ICD-10-CM

## 2025-03-20 LAB
HBV SURFACE AG SER-ACNC: <0.1 [IU]/L
HBV SURFACE AG SERPL QL IA: NONREACTIVE
HCV AB SERPL QL IA: NONREACTIVE
T PALLIDUM AB SER QL IA: NONREACTIVE

## 2025-03-20 PROCEDURE — 86803 HEPATITIS C AB TEST: CPT | Performed by: NURSE PRACTITIONER

## 2025-03-20 PROCEDURE — 99385 PREV VISIT NEW AGE 18-39: CPT | Performed by: NURSE PRACTITIONER

## 2025-03-20 PROCEDURE — 36415 COLL VENOUS BLD VENIPUNCTURE: CPT | Performed by: NURSE PRACTITIONER

## 2025-03-20 PROCEDURE — 87340 HEPATITIS B SURFACE AG IA: CPT | Performed by: NURSE PRACTITIONER

## 2025-03-20 PROCEDURE — 87389 HIV-1 AG W/HIV-1&-2 AB AG IA: CPT | Performed by: NURSE PRACTITIONER

## 2025-03-20 PROCEDURE — 86780 TREPONEMA PALLIDUM: CPT | Performed by: NURSE PRACTITIONER

## 2025-03-20 RX ORDER — DESVENLAFAXINE 50 MG/1
50 TABLET, FILM COATED, EXTENDED RELEASE ORAL EVERY MORNING
COMMUNITY
Start: 2025-02-02

## 2025-03-20 RX ORDER — TIRZEPATIDE 2.5 MG/.5ML
2.5 INJECTION, SOLUTION SUBCUTANEOUS
COMMUNITY
Start: 2025-01-17

## 2025-03-20 NOTE — PROGRESS NOTES
[facility]    Obstetrics and Gynecology    Chief Complaint   Patient presents with    Gyn Exam     NEW PATIENT, ANNUAL EXAM       Emperatriz Lanza is a 35 year old female  Patient's last menstrual period was 2025 (approximate). presenting for annual gynecology exam.  New patient.  She reports periods come every month. Lasting 4-5 days. Normal flow, no pain with periods.  She had a termination in 2024 and had some irregular periods after but now regular again.    Having some vaginal health concerns. She reports with intercourse having a lot of discharge however not as lubricated during intercourse, she has urinary urgency. Also having vaginal irritation, no odor. She is using summers aleena wash.  She has a new partner since her last pregnancy in . Desires sti testing. Not using contraception.    HSV - no outbreak since   She is on trizepitide for weight loss.    She is sexually active, male partner. Not using contraception.    Pap:1 year ago - human papillomavirus per patient - colposcopy 2023 LSIL/EDIE 1  2023 ASCUS, human papillomavirus+  Contraception:none    Colonoscopy: n/a    OBSTETRICS HISTORY:  OB History    Para Term  AB Living   4 1 1 0 3 1   SAB IAB Ectopic Multiple Live Births   0 2 0 0 1       GYNE HISTORY:  Menarche: 11-12 (3/20/2025  2:09 PM)  Period Cycle (Days): MONTHLY (3/20/2025  2:09 PM)  Period Duration (Days): 4-5 (3/20/2025  2:09 PM)  Period Flow: NORMAL (3/20/2025  2:09 PM)  Use of Birth Control (if yes, specify type): None (3/20/2025  2:09 PM)  Pap Result Notes: 1 YRS AGO POS HPV (3/20/2025  2:09 PM)      History   Sexual Activity    Sexual activity: Yes       MEDICAL HISTORY:  Past Medical History:    Anxiety    Esophageal reflux    Essential hypertension    Genital herpes    Migraines     Past Surgical History:   Procedure Laterality Date    Egd         SOCIAL HISTORY:  Social History     Socioeconomic History    Marital status:       Spouse name: Not on file    Number of children: Not on file    Years of education: Not on file    Highest education level: Not on file   Occupational History    Not on file   Tobacco Use    Smoking status: Never    Smokeless tobacco: Never   Vaping Use    Vaping status: Never Used   Substance and Sexual Activity    Alcohol use: Yes     Comment: socially    Drug use: Yes     Types: Cannabis     Comment: socially    Sexual activity: Yes   Other Topics Concern    Not on file   Social History Narrative    Not on file     Social Drivers of Health     Food Insecurity: Not on file   Transportation Needs: Not on file   Stress: Not on file   Housing Stability: At Risk (8/18/2023)    Received from [facility]     Living Situation: Not on file     Housing Problems: Not on file         Depression Screening (PHQ-2/PHQ-9): Over the LAST 2 WEEKS   Little interest or pleasure in doing things: Not at all    Feeling down, depressed, or hopeless: Not at all    PHQ-2 SCORE: 0           FAMILY HISTORY:  No family history on file.    MEDICATIONS:    Current Outpatient Medications:     desvenlafaxine ER 50 MG Oral Tablet 24 Hr, Take 1 tablet (50 mg total) by mouth every morning., Disp: , Rfl:     Tirzepatide (MOUNJARO) 2.5 MG/0.5ML Subcutaneous Solution Auto-injector, Inject 2.5 mg into the skin every 7 days., Disp: , Rfl:     albuterol 108 (90 Base) MCG/ACT Inhalation Aero Soln, Inhale 1 puff and hold breath for 10 seconds then exhale.  Wait 1 full minute and repeat for second puff.  Use every 4-6 hours as needed., Disp: 1 each, Rfl: 0    benzonatate 100 MG Oral Cap, Take 1 capsule (100 mg total) by mouth 3 (three) times daily as needed for cough., Disp: 30 capsule, Rfl: 0    Valsartan-hydroCHLOROthiazide 160-25 MG Oral Tab, Take 1 tablet by mouth daily., Disp: , Rfl:     pantoprazole 40 MG Oral Tab EC, Take 1 tablet (40 mg total) by mouth daily as needed. (Patient not taking: Reported on 3/20/2025), Disp: , Rfl:      ALLERGIES:  Allergies[1]      Review of Systems:  Constitutional:  Denies fatigue, night sweats, hot flashes  Eyes:  denies blurred or double vision  Cardiovascular:  denies chest pain or palpitations  Respiratory:  denies shortness of breath  Gastrointestinal:  denies heartburn, abdominal pain, diarrhea or constipation  Genitourinary:  denies dysuria, incontinence, abnormal vaginal discharge, see HPI vaginal itching,   Musculoskeletal:  denies back pain   Skin/Breast:  Denies any breast pain, lumps, or discharge.   Neurological:  denies headaches, extremity weakness or numbness.  Psychiatric: denies depression or anxiety.  Endocrine:   denies excessive thirst or urination.  Heme/Lymph:  denies history of anemia, easy bruising or bleeding.      PHYSICAL EXAM:     Vitals:    03/20/25 1407   BP: 104/74   Pulse: 84   Weight: 247 lb (112 kg)       Body mass index is 41.1 kg/m².     Patient offered chaperone, patient declined    Constitutional: well developed, well nourished  Psychiatric:  Oriented to time, place, person and situation. Appropriate mood and affect  Head/Face: normocephalic  Neck/Thyroid: thyroid symmetric, no thyromegaly, no nodules, no adenopathy  Lymphatic:no abnormal supraclavicular or axillary adenopathy is noted  Breast: normal without palpable masses, tenderness, asymmetry, nipple discharge, nipple retraction or skin changes  Abdomen:  soft, nontender, nondistended, no masses  Skin/Hair: no unusual rashes or bruises  Extremities: no edema, no cyanosis    Pelvic Exam:  External Genitalia: normal appearance, hair distribution, and no lesions  Urethral Meatus:  normal in size, location, without lesions and prolapse  Bladder:  No fullness, masses or tenderness  Vagina:  Normal appearance without lesions, no abnormal discharge  Cervix:  Normal without tenderness on motion  Uterus: normal in size, contour, position, mobility, without tenderness  Adnexa: normal without masses or tenderness  Perineum:  normal  Anus: no hemorroids     Assessment & Plan:    ICD-10-CM    1. Well woman exam with routine gynecological exam  Z01.419       2. Screening for cervical cancer  Z12.4 ThinPrep PAP Smear     Hpv Dna  High Risk , Thin Prep Collect     ThinPrep PAP Smear     Hpv Dna  High Risk , Thin Prep Collect      3. Screening examination for STI  Z11.3 HIV Ag/Ab Combo     Hepatitis B Surface Antigen     HCV Antibody     T Pallidum Screening [place]     Vaginitis Vaginosis PCR Panel     Vaginitis Vaginosis PCR Panel     CANCELED: Chlamydia/Gc Amplification      4. Urinary urgency  R39.15 Urogynecology Referral - In Network         Reviewed ASCCP guidelines with the patient   Pap done today  Last pap abnormal, EDIE 1 on colpo  Contraception: Using none  Desires sti testing and will do cultures for bacterial vaginosis/yeast --Encouraged condoms to prevent STD exposure. Reviewed vaginal hygiene.  Urinary urgency - refer to urogyne  Breast Health:     Reviewed current guidelines with the patient and to start Mammograms at age 40  Reviewed monthly self breast exams with the patient   Discussed diet, exercise, MVIs with Ca/Vit D  Follow up in 1 yr for CASSIDY Escobedo    This note was prepared using Dragon Medical voice recognition dictation software. As a result errors may occur. When identified these errors have been corrected. While every attempt is made to correct errors during dictation discrepancies may still exist.         [1]   Allergies  Allergen Reactions    Shellfish Allergy HIVES    Shrimp NAUSEA AND VOMITING and HIVES    Amoxicillin NAUSEA AND VOMITING    Shrimp Flavor UNKNOWN

## 2025-03-21 LAB
BV BACTERIA DNA VAG QL NAA+PROBE: NEGATIVE
C GLABRATA DNA VAG QL NAA+PROBE: NEGATIVE
C KRUSEI DNA VAG QL NAA+PROBE: NEGATIVE
CANDIDA DNA VAG QL NAA+PROBE: POSITIVE
HPV E6+E7 MRNA CVX QL NAA+PROBE: POSITIVE
T VAGINALIS DNA VAG QL NAA+PROBE: NEGATIVE

## 2025-03-25 ENCOUNTER — TELEPHONE (OUTPATIENT)
Dept: OBGYN CLINIC | Facility: CLINIC | Age: 35
End: 2025-03-25

## 2025-03-25 DIAGNOSIS — Z32.00 PREGNANCY EXAMINATION OR TEST, PREGNANCY UNCONFIRMED: Primary | ICD-10-CM

## 2025-03-25 LAB
HPV16 DNA CVX QL PROBE+SIG AMP: NEGATIVE
HPV18 DNA CVX QL PROBE+SIG AMP: NEGATIVE

## 2025-03-25 RX ORDER — FLUCONAZOLE 150 MG/1
150 TABLET ORAL ONCE
Qty: 1 TABLET | Refills: 0 | Status: SHIPPED | OUTPATIENT
Start: 2025-03-25 | End: 2025-03-25

## 2025-03-25 NOTE — TELEPHONE ENCOUNTER
Edna Larkin, APRBESSIE  3/24/2025  9:28 AM CDT Back to Top      Pap LSIL, human papillomavirus +.  Needs colposcopy  Also culture +yeast - please prescribe fluconazole 150 mg PO x 1     Edna Larkin, APRN     Pt informed of recs and verbalized understanding. Colpo explained to pt. Pt scheduled colpo with GINGERK on 5/2. Pt is not on BC. Pt informed she will need to complete hcg qual the day prior to colpo. Order placed. Advised to take 600mg ibuprofen 30-60 min prior to appt. To JLK as FYI.

## 2025-03-25 NOTE — TELEPHONE ENCOUNTER
Patient called in regarding test results.  Patient request a nurse to call to advise if she will need a prescription.   Please call

## 2025-05-07 ENCOUNTER — APPOINTMENT (OUTPATIENT)
Dept: GENERAL RADIOLOGY | Facility: HOSPITAL | Age: 35
End: 2025-05-07
Attending: EMERGENCY MEDICINE
Payer: COMMERCIAL

## 2025-05-07 ENCOUNTER — APPOINTMENT (OUTPATIENT)
Dept: CT IMAGING | Facility: HOSPITAL | Age: 35
End: 2025-05-07
Attending: EMERGENCY MEDICINE
Payer: COMMERCIAL

## 2025-05-07 ENCOUNTER — HOSPITAL ENCOUNTER (EMERGENCY)
Facility: HOSPITAL | Age: 35
Discharge: HOME | End: 2025-05-07
Attending: EMERGENCY MEDICINE
Payer: COMMERCIAL

## 2025-05-07 VITALS
HEART RATE: 86 BPM | OXYGEN SATURATION: 100 % | TEMPERATURE: 98 F | DIASTOLIC BLOOD PRESSURE: 92 MMHG | RESPIRATION RATE: 18 BRPM | SYSTOLIC BLOOD PRESSURE: 137 MMHG

## 2025-05-07 DIAGNOSIS — S09.90XA INJURY OF HEAD, INITIAL ENCOUNTER: Primary | ICD-10-CM

## 2025-05-07 DIAGNOSIS — S40.019A CONTUSION OF SHOULDER, UNSPECIFIED LATERALITY, INITIAL ENCOUNTER: ICD-10-CM

## 2025-05-07 PROCEDURE — 70450 CT HEAD/BRAIN W/O DYE: CPT | Performed by: EMERGENCY MEDICINE

## 2025-05-07 PROCEDURE — 73030 X-RAY EXAM OF SHOULDER: CPT | Performed by: EMERGENCY MEDICINE

## 2025-05-07 PROCEDURE — 99284 EMERGENCY DEPT VISIT MOD MDM: CPT

## 2025-05-07 RX ORDER — NAPROXEN 500 MG/1
500 TABLET ORAL 2 TIMES DAILY WITH MEALS
Qty: 10 TABLET | Refills: 0 | Status: SHIPPED | OUTPATIENT
Start: 2025-05-07 | End: 2025-05-12

## 2025-05-07 NOTE — ED INITIAL ASSESSMENT (HPI)
Reports she got caught in school bust doors. C/o bilat shoulder pain, right ear & right side of head pain.
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
## 2025-06-18 ENCOUNTER — TELEPHONE (OUTPATIENT)
Dept: OBGYN CLINIC | Facility: CLINIC | Age: 35
End: 2025-06-18

## 2025-06-18 DIAGNOSIS — Z32.00 PREGNANCY EXAMINATION OR TEST, PREGNANCY UNCONFIRMED: Primary | ICD-10-CM

## 2025-06-18 NOTE — TELEPHONE ENCOUNTER
Patient scheduled an appointment via [employer] for 7/23 in [place] with the following note:    I believe this was for a biopsy, or to look into my abnormal pap further.     Had a colpo scheduled on 5/2 with Dr Day that was missed.    Please call.

## 2025-06-18 NOTE — TELEPHONE ENCOUNTER
Emperatriz needs to reschedule colpo.     Rescheduled to 6/25 at 3:40 pm with Dr. Hawkins.   Reminded to complete hcg at last day prior (6/24).  Take motrin 600 mg 1 hr prior to appointment.   If on menses, will need to reschedule.   Patient verbalized understanding.

## 2025-06-25 ENCOUNTER — TELEPHONE (OUTPATIENT)
Dept: OBGYN CLINIC | Facility: CLINIC | Age: 35
End: 2025-06-25

## 2025-06-25 NOTE — TELEPHONE ENCOUNTER
Patient was scheduled for a colpo today with [facility] but didn't not complete her blood work. She was not seen in office and needs to be rescheduled. Patient was upset that no one called her ahead of the appointment to cancel it. Would like toe seen as soon as possible.

## 2025-06-25 NOTE — TELEPHONE ENCOUNTER
Patient did not due blood work prior to colpo so it was not done today.  Patient asking to be rescheduled asap.    Message to Dr. Hawkins.  Next available opening is on 8/15.  Are you able to add patient sooner?

## 2025-07-10 ENCOUNTER — APPOINTMENT (OUTPATIENT)
Dept: INTERNAL MEDICINE | Age: 35
End: 2025-07-10

## 2025-07-10 ENCOUNTER — TELEPHONE (OUTPATIENT)
Dept: BARIATRICS/WEIGHT MGMT | Age: 35
End: 2025-07-10

## 2025-07-10 ENCOUNTER — E-ADVICE (OUTPATIENT)
Dept: BARIATRICS/WEIGHT MGMT | Age: 35
End: 2025-07-10

## 2025-07-10 VITALS
RESPIRATION RATE: 16 BRPM | OXYGEN SATURATION: 100 % | SYSTOLIC BLOOD PRESSURE: 136 MMHG | DIASTOLIC BLOOD PRESSURE: 86 MMHG | WEIGHT: 245.92 LBS | HEIGHT: 65 IN | BODY MASS INDEX: 40.97 KG/M2 | HEART RATE: 68 BPM | TEMPERATURE: 97.9 F

## 2025-07-10 DIAGNOSIS — E66.01 OBESITY, MORBID, BMI 40.0-49.9  (CMD): ICD-10-CM

## 2025-07-10 DIAGNOSIS — I10 ESSENTIAL HYPERTENSION: ICD-10-CM

## 2025-07-10 DIAGNOSIS — Z12.31 SCREENING MAMMOGRAM FOR BREAST CANCER: ICD-10-CM

## 2025-07-10 DIAGNOSIS — Z00.00 ENCOUNTER FOR GENERAL ADULT MEDICAL EXAMINATION W/O ABNORMAL FINDINGS: Primary | ICD-10-CM

## 2025-07-10 DIAGNOSIS — Z80.3 FAMILY HISTORY OF MALIGNANT NEOPLASM OF BREAST: ICD-10-CM

## 2025-07-10 DIAGNOSIS — E55.9 VITAMIN D DEFICIENCY: ICD-10-CM

## 2025-07-10 PROCEDURE — 99385 PREV VISIT NEW AGE 18-39: CPT | Performed by: INTERNAL MEDICINE

## 2025-07-10 RX ORDER — FLUTICASONE PROPIONATE 50 MCG
1 SPRAY, SUSPENSION (ML) NASAL
COMMUNITY

## 2025-07-10 RX ORDER — VALACYCLOVIR HYDROCHLORIDE 1 G/1
TABLET, FILM COATED ORAL
COMMUNITY
Start: 2025-02-02

## 2025-07-10 RX ORDER — CLINDAMYCIN PHOSPHATE 10 UG/ML
LOTION TOPICAL
COMMUNITY
Start: 2025-02-11

## 2025-07-10 RX ORDER — DESVENLAFAXINE 50 MG/1
50 TABLET, FILM COATED, EXTENDED RELEASE ORAL
COMMUNITY
Start: 2025-02-02

## 2025-07-10 RX ORDER — MECLIZINE HCL 12.5 MG 12.5 MG/1
TABLET ORAL
COMMUNITY
Start: 2025-01-17 | End: 2025-07-10 | Stop reason: ALTCHOICE

## 2025-07-10 RX ORDER — FAMOTIDINE 40 MG/1
40 TABLET, FILM COATED ORAL DAILY
COMMUNITY

## 2025-07-10 RX ORDER — VALSARTAN AND HYDROCHLOROTHIAZIDE 160; 25 MG/1; MG/1
1 TABLET ORAL DAILY
COMMUNITY
Start: 2025-04-02

## 2025-07-10 RX ORDER — FLUCONAZOLE 150 MG/1
TABLET ORAL
COMMUNITY
Start: 2025-03-25

## 2025-07-10 RX ORDER — TIRZEPATIDE 2.5 MG/.5ML
2.5 INJECTION, SOLUTION SUBCUTANEOUS
COMMUNITY
Start: 2025-01-17 | End: 2025-07-10 | Stop reason: ALTCHOICE

## 2025-07-10 RX ORDER — DOXYCYCLINE 100 MG/1
CAPSULE ORAL
COMMUNITY
Start: 2025-02-11

## 2025-07-10 RX ORDER — METRONIDAZOLE 500 MG/1
500 TABLET ORAL 2 TIMES DAILY
COMMUNITY
Start: 2025-01-18

## 2025-07-10 ASSESSMENT — PATIENT HEALTH QUESTIONNAIRE - PHQ9
CLINICAL INTERPRETATION OF PHQ2 SCORE: NO FURTHER SCREENING NEEDED
SUM OF ALL RESPONSES TO PHQ9 QUESTIONS 1 AND 2: 0
2. FEELING DOWN, DEPRESSED OR HOPELESS: NOT AT ALL
1. LITTLE INTEREST OR PLEASURE IN DOING THINGS: NOT AT ALL
SUM OF ALL RESPONSES TO PHQ9 QUESTIONS 1 AND 2: 0

## 2025-07-14 ENCOUNTER — TELEPHONE (OUTPATIENT)
Dept: BARIATRICS/WEIGHT MGMT | Age: 35
End: 2025-07-14

## 2025-07-14 ASSESSMENT — ENCOUNTER SYMPTOMS
DIZZINESS: 1
SHORTNESS OF BREATH: 1
HEMATOLOGIC/LYMPHATIC NEGATIVE: 1
EYES NEGATIVE: 1
GASTROINTESTINAL NEGATIVE: 1
PSYCHIATRIC NEGATIVE: 1
ENDOCRINE NEGATIVE: 1
ALLERGIC/IMMUNOLOGIC NEGATIVE: 1
FATIGUE: 1

## 2025-07-21 ENCOUNTER — E-ADVICE (OUTPATIENT)
Dept: BARIATRICS/WEIGHT MGMT | Age: 35
End: 2025-07-21

## 2025-07-21 ENCOUNTER — TELEPHONE (OUTPATIENT)
Dept: BARIATRICS/WEIGHT MGMT | Age: 35
End: 2025-07-21

## 2025-08-07 ENCOUNTER — TELEPHONE (OUTPATIENT)
Dept: BARIATRICS/WEIGHT MGMT | Age: 35
End: 2025-08-07

## 2025-08-12 ENCOUNTER — TELEPHONE (OUTPATIENT)
Dept: BARIATRICS/WEIGHT MGMT | Age: 35
End: 2025-08-12

## 2025-08-15 ENCOUNTER — TELEPHONE (OUTPATIENT)
Dept: BARIATRICS/WEIGHT MGMT | Age: 35
End: 2025-08-15

## 2025-08-15 ENCOUNTER — APPOINTMENT (OUTPATIENT)
Dept: BARIATRICS/WEIGHT MGMT | Age: 35
End: 2025-08-15

## 2025-08-20 ENCOUNTER — TELEPHONE (OUTPATIENT)
Dept: BARIATRICS/WEIGHT MGMT | Age: 35
End: 2025-08-20

## 2025-08-23 ENCOUNTER — LAB ENCOUNTER (OUTPATIENT)
Dept: LAB | Age: 35
End: 2025-08-23
Attending: NURSE PRACTITIONER

## 2025-08-23 ENCOUNTER — LAB ENCOUNTER (OUTPATIENT)
Dept: LAB | Facility: HOSPITAL | Age: 35
End: 2025-08-23
Attending: NURSE PRACTITIONER

## 2025-08-23 ENCOUNTER — OFFICE VISIT (OUTPATIENT)
Dept: OBGYN CLINIC | Facility: CLINIC | Age: 35
End: 2025-08-23

## 2025-08-23 VITALS
DIASTOLIC BLOOD PRESSURE: 74 MMHG | HEART RATE: 71 BPM | SYSTOLIC BLOOD PRESSURE: 110 MMHG | WEIGHT: 242.63 LBS | BODY MASS INDEX: 40 KG/M2

## 2025-08-23 DIAGNOSIS — Z32.00 PREGNANCY EXAMINATION OR TEST, PREGNANCY UNCONFIRMED: ICD-10-CM

## 2025-08-23 DIAGNOSIS — R87.612 PAPANICOLAOU SMEAR OF CERVIX WITH LOW GRADE SQUAMOUS INTRAEPITHELIAL LESION (LGSIL): Primary | ICD-10-CM

## 2025-08-23 LAB — HCG SERPL QL: NEGATIVE

## 2025-08-23 PROCEDURE — 36415 COLL VENOUS BLD VENIPUNCTURE: CPT

## 2025-08-23 PROCEDURE — 84703 CHORIONIC GONADOTROPIN ASSAY: CPT

## 2025-08-23 PROCEDURE — 57454 BX/CURETT OF CERVIX W/SCOPE: CPT | Performed by: OBSTETRICS & GYNECOLOGY

## 2025-08-25 ENCOUNTER — TELEPHONE (OUTPATIENT)
Dept: OBGYN CLINIC | Facility: CLINIC | Age: 35
End: 2025-08-25

## 2025-09-19 ENCOUNTER — APPOINTMENT (OUTPATIENT)
Dept: BARIATRICS/WEIGHT MGMT | Age: 35
End: 2025-09-19

## (undated) NOTE — LETTER
Date & Time: 8/27/2024, 6:20 PM  Patient: Emperatriz Lanza  Encounter Provider(s):    Jonelle Olivera APRN       To Whom It May Concern:    Emperatriz Lanza was seen and treated in our department on 8/27/2024. She should not return to work until 9/2/2024 .    If you have any questions or concerns, please do not hesitate to call.        _____________________________  CASSIDY Zhu

## (undated) NOTE — LETTER
Date & Time: 8/25/2024, 3:15 PM  Patient: Emperatriz Lanaz  Encounter Provider(s):    Louisa Alberts APRN       To Whom It May Concern:    Emperatriz Lanza was seen and treated in our department on 8/25/2024. She can return to work when she remains fever free. Please excuse from remotes work and in person works until fever resolved.     CASSIDY Whitaker, 08/25/24, 3:15 PM\